# Patient Record
Sex: FEMALE | Race: WHITE | URBAN - METROPOLITAN AREA
[De-identification: names, ages, dates, MRNs, and addresses within clinical notes are randomized per-mention and may not be internally consistent; named-entity substitution may affect disease eponyms.]

---

## 2017-02-15 ENCOUNTER — HOSPITAL ENCOUNTER (OUTPATIENT)
Dept: MAMMOGRAPHY | Facility: CLINIC | Age: 42
End: 2017-02-15
Attending: OBSTETRICS & GYNECOLOGY
Payer: COMMERCIAL

## 2017-02-15 ENCOUNTER — HOSPITAL ENCOUNTER (OUTPATIENT)
Dept: MAMMOGRAPHY | Facility: CLINIC | Age: 42
Discharge: HOME | End: 2017-02-15
Attending: OBSTETRICS & GYNECOLOGY | Admitting: OBSTETRICS & GYNECOLOGY
Payer: COMMERCIAL

## 2017-02-15 DIAGNOSIS — R92.8 ABNORMAL MAMMOGRAM: ICD-10-CM

## 2017-02-15 PROCEDURE — 77061 BREAST TOMOSYNTHESIS UNI: CPT

## 2017-02-15 PROCEDURE — 76642 ULTRASOUND BREAST LIMITED: CPT | Mod: LT

## 2018-12-11 ENCOUNTER — OFFICE VISIT (OUTPATIENT)
Dept: FAMILY MEDICINE | Facility: CLINIC | Age: 43
End: 2018-12-11

## 2018-12-11 VITALS — HEIGHT: 63 IN | BODY MASS INDEX: 24.8 KG/M2 | RESPIRATION RATE: 16 BRPM | WEIGHT: 140 LBS

## 2018-12-11 DIAGNOSIS — R42 DIZZINESS: Primary | ICD-10-CM

## 2018-12-11 LAB
% GRANULOCYTES: 60.9 % (ref 42.2–75.2)
HCT VFR BLD AUTO: 36.1 % (ref 35–46)
HEMOGLOBIN: 12 G/DL (ref 11.8–15.5)
LYMPHOCYTES NFR BLD AUTO: 30.8 % (ref 20.5–51.1)
MCH RBC QN AUTO: 28.1 PG (ref 27–31)
MCHC RBC AUTO-ENTMCNC: 33.2 G/DL (ref 33–37)
MCV RBC AUTO: 84.5 FL (ref 80–100)
MONOCYTES NFR BLD AUTO: 8.3 % (ref 1.7–9.3)
PLATELET # BLD AUTO: 217 K/UL (ref 140–450)
RBC # BLD AUTO: 4.27 X10/CMM (ref 3.7–5.2)
WBC # BLD AUTO: 6.9 X10/CMM (ref 3.8–11)

## 2018-12-11 PROCEDURE — 36415 COLL VENOUS BLD VENIPUNCTURE: CPT | Performed by: NURSE PRACTITIONER

## 2018-12-11 PROCEDURE — 80050 GENERAL HEALTH PANEL: CPT | Mod: 90 | Performed by: NURSE PRACTITIONER

## 2018-12-11 PROCEDURE — 99213 OFFICE O/P EST LOW 20 MIN: CPT | Performed by: NURSE PRACTITIONER

## 2018-12-11 RX ORDER — BETAMETHASONE DIPROPIONATE 0.5 MG/G
OINTMENT, AUGMENTED TOPICAL
Refills: 12 | COMMUNITY
Start: 2018-10-15

## 2018-12-11 ASSESSMENT — MIFFLIN-ST. JEOR: SCORE: 1256.23

## 2018-12-11 NOTE — LETTER
[facility]  [address] [place] [id number] [phone]      December 11, 2018      Tisha Yates  [address] [facility] [address]      EMERGENCY CARE PLAN  Presenting Problem Treatment Plan   Questions or concerns during clinic hours I will call the clinic directly:    [facility]  [address] [place], [place] [address] [phone]   Questions or concerns outside clinic hours I will call the after-hours on-call line at [phone]   Patient needs to schedule an appointment I will call the scheduling team during business hours at [phone]   Same day treatment  I will call the clinic first, then urgent care and express care if needed   Clinic Care Coordinators DEMARIO King:  [phone]  Manda Zaman RN: [phone]   Crisis Services:  Behavioral or Mental Health [facility] ([facility])   [phone]   Emergency treatment--Immediately CALL [phone]

## 2018-12-11 NOTE — LETTER
[facility]  [address] [place]  [address]  Phone: [phone]    December 12, 2018      Tisha Yates  [address] [facility] [address]              Dear Tisha,    The results from your recent visit showed All labs normal. Follow up if dizziness continues.        Sincerely,     Liliane Hammonds CNP    Results for orders placed or performed in visit on 12/11/18   Comp. Metabolic Panel (14) ([facility])   Result Value Ref Range    Glucose 88 65 - 99 mg/dL    Urea Nitrogen 13 6 - 24 mg/dL    Creatinine 0.84 0.57 - 1.00 mg/dL    eGFR If NonAfricn Am 86 >59 mL/min/1.73    eGFR If Africn Am 99 >59 mL/min/1.73    BUN/Creatinine Ratio 15 9 - 23    Sodium 140 134 - 144 mmol/L    Potassium 4.3 3.5 - 5.2 mmol/L    Chloride 102 96 - 106 mmol/L    Total CO2 25 20 - 29 mmol/L    Calcium 9.4 8.7 - 10.2 mg/dL    Protein Total 6.5 6.0 - 8.5 g/dL    Albumin 4.2 3.5 - 5.5 g/dL    Globulin, Total 2.3 1.5 - 4.5 g/dL    A/G Ratio 1.8 1.2 - 2.2    Bilirubin Total 0.2 0.0 - 1.2 mg/dL    Alkaline Phosphatase 44 39 - 117 IU/L    AST 28 0 - 40 IU/L    ALT 23 0 - 32 IU/L    Narrative    Performed at:  01 - [facility]  [address] [place] [address]  [id number]  : Stuart Galeano MD, Phone:  [phone]   CBC with Diff/Plt ([facility])   Result Value Ref Range    WBC x10/cmm 6.9 3.8 - 11.0 x10/cmm    % Lymphocytes 30.8 20.5 - 51.1 %    % Monocytes 8.3 1.7 - 9.3 %    % Granulocytes 60.9 42.2 - 75.2 %    RBC x10/cmm 4.27 3.7 - 5.2 x10/cmm    Hemoglobin 12.0 11.8 - 15.5 g/dl    Hematocrit 36.1 35 - 46 %    MCV 84.5 80 - 100 fL    MCH 28.1 27.0 - 31.0 pg    MCHC 33.2 33.0 - 37.0 g/dL    Platelet Count 217 140 - 450 K/uL   TSH ([facility])   Result Value Ref Range    TSH 1.310 0.450 - 4.500 uIU/mL    Narrative    Performed at:  01 - [facility] [address] [place], [place]  [id number]  : Stuart Galeano MD, Phone:  [phone]

## 2018-12-11 NOTE — PROGRESS NOTES
"Problem(s) Oriented visit        SUBJECTIVE:                                                    Tisha Yates is a 43 year old female who presents to clinic today for the following health issues :   Dizziness, like room spinning, occasionally last 2 weeks. Happened one time on sat, last night, and today. Noticed when chaged positions quickly. Lasts for a couple minutes.  No cold symptoms or other symptoms. Drinks a lot of water.  Has not had issues with BS. No recent issues with anemia.  Not on any meds.   History of seasonal allergies but have been good except excema.   Happened a couple years ago and it just went away.    Has only seen GYN since in [place] 7 years. From [place].           Problem list, Medication list, Allergies, and Medical/Social/Surgical histories reviewed in [facility] and updated as appropriate.   Additional history: as documented    ROS:  5 point ROS completed and negative except noted above, including Gen, CV, Resp, GI, MS    Histories:   There is no problem list on file for this patient.    No past surgical history on file.    Social History     Tobacco Use     Smoking status: Never Smoker     Smokeless tobacco: Never Used   Substance Use Topics     Alcohol use: Not on file     No family history on file.        OBJECTIVE:                                                    Resp 16   Ht 1.588 m (5' 2.5\")   Wt 63.5 kg (140 lb)   BMI 25.20 kg/m    Body mass index is 25.2 kg/m .   APPEARANCE: = Relaxed and in no distress  Conj/Eyelids = noninjected and lids and lashes are without inflammation  PERRLA/Irises = Pupils Round Reactive to Light and Irisis without inflammation  Ears/Nose = External structures and Nares have usual shape and form  Ear canals and TM's = Canals are not inflammed and have none or little wax and the drums are not injected and have a light reflex   Lips/Teeth/Gums = No lesions seen, good dentition, and gums seem healthy  Oropharynx = No leukoplakia, No injection to the " tissues, Normal Uvula  Neck = No anterior or posterior adenopathy appreciated.  Thyroid = Not enlarged and no masses felt  Resp effort = Calm regular breathing  Breath Sounds = Good air movement with no rales or rhonchi in any lung fields  Heart Rate, Rhythm, & sounds (no Murm)  = Regular rate and rhythm with no S3, S4, or murmur appreciated.  Abdomen = Soft, nontender, no masses, & bowel sounds in all quadrants  Ext (edema) = No pretibial edema noted or elsewhere  Musculsktl =  Strength and ROM of major joints are within normal limits  SKIN = absent significant rashes or lesions   Recent/Remote Memory = Alert and Oriented x 3  NEURO: Normal strength and tone, mentation intact, speech normal and DTR symmetrically normal in lower extremities  Mood/Affect = Cooperative and interested     ASSESSMENT/PLAN:                                                      (R42) Dizziness  (primary encounter diagnosis)  Plan: Comp. Metabolic Panel (14) ([facility]), CBC with         Diff/Plt ([facility]), TSH ([facility])              FUTURE APPOINTMENTS:       - Follow-up for annual visit or as needed    The following health maintenance items are reviewed in Epic and correct as of today:  Health Maintenance   Topic Date Due     DTAP/TDAP/TD IMMUNIZATION (1 - Tdap) 01/10/1983     PHQ-2 Q1 YR  01/10/1988     HIV SCREEN (SYSTEM ASSIGNED)  01/10/1994     PAP SCREENING Q3 YR (SYSTEM ASSIGNED)  01/10/1997     INFLUENZA VACCINE (1) 09/01/2018     ZOSTER IMMUNIZATION (1 of 2) 01/10/2026     IPV IMMUNIZATION  Aged Out     MENINGITIS IMMUNIZATION  Aged Out       JUNIOR Graves CNP  [facility]  Family Practice  [facility]  [phone]    For any issues my office # is [phone]

## 2018-12-12 LAB
ALBUMIN SERPL-MCNC: 4.2 G/DL (ref 3.5–5.5)
ALBUMIN/GLOB SERPL: 1.8 {RATIO} (ref 1.2–2.2)
ALP SERPL-CCNC: 44 IU/L (ref 39–117)
ALT SERPL-CCNC: 23 IU/L (ref 0–32)
AST SERPL-CCNC: 28 IU/L (ref 0–40)
BILIRUB SERPL-MCNC: 0.2 MG/DL (ref 0–1.2)
BUN SERPL-MCNC: 13 MG/DL (ref 6–24)
BUN/CREATININE RATIO: 15 (ref 9–23)
CALCIUM SERPL-MCNC: 9.4 MG/DL (ref 8.7–10.2)
CHLORIDE SERPLBLD-SCNC: 102 MMOL/L (ref 96–106)
CREAT SERPL-MCNC: 0.84 MG/DL (ref 0.57–1)
EGFR IF AFRICN AM: 99 ML/MIN/1.73
EGFR IF NONAFRICN AM: 86 ML/MIN/1.73
GLOBULIN, TOTAL: 2.3 G/DL (ref 1.5–4.5)
GLUCOSE SERPL-MCNC: 88 MG/DL (ref 65–99)
POTASSIUM SERPL-SCNC: 4.3 MMOL/L (ref 3.5–5.2)
PROT SERPL-MCNC: 6.5 G/DL (ref 6–8.5)
SODIUM SERPL-SCNC: 140 MMOL/L (ref 134–144)
TOTAL CO2: 25 MMOL/L (ref 20–29)
TSH BLD-ACNC: 1.31 UIU/ML (ref 0.45–4.5)

## 2022-01-05 PROCEDURE — 88305 TISSUE EXAM BY PATHOLOGIST: CPT | Mod: 26 | Performed by: PATHOLOGY

## 2022-01-05 PROCEDURE — 88305 TISSUE EXAM BY PATHOLOGIST: CPT | Mod: TC,ORL | Performed by: OBSTETRICS & GYNECOLOGY

## 2022-01-06 ENCOUNTER — LAB REQUISITION (OUTPATIENT)
Dept: LAB | Facility: CLINIC | Age: 47
End: 2022-01-06
Payer: COMMERCIAL

## 2022-01-07 LAB
PATH REPORT.COMMENTS IMP SPEC: NORMAL
PATH REPORT.COMMENTS IMP SPEC: NORMAL
PATH REPORT.FINAL DX SPEC: NORMAL
PATH REPORT.GROSS SPEC: NORMAL
PATH REPORT.MICROSCOPIC SPEC OTHER STN: NORMAL
PATH REPORT.RELEVANT HX SPEC: NORMAL
PHOTO IMAGE: NORMAL